# Patient Record
Sex: MALE | Race: WHITE | Employment: FULL TIME | URBAN - METROPOLITAN AREA
[De-identification: names, ages, dates, MRNs, and addresses within clinical notes are randomized per-mention and may not be internally consistent; named-entity substitution may affect disease eponyms.]

---
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
## 2023-03-31 ENCOUNTER — TELEPHONE (OUTPATIENT)
Dept: FAMILY MEDICINE CLINIC | Age: 23
End: 2023-03-31

## 2023-03-31 NOTE — TELEPHONE ENCOUNTER
FYI:  Message conveyed to Mother Angeline. She was not happy and said she is not going to take him to the ER.

## 2023-03-31 NOTE — TELEPHONE ENCOUNTER
Mom (Angeline) called stating that Louie Myles was sitting on toilet this morning at 6 am and passed out. No symptoms, just passed out. States this is not the first time this has happened. He has some sinus drainage. I told her he should go to the ER, she disagreed. Wants to know if you would order labs, Shayne Huynh thinks it may be his sugar\". Please advise.

## 2023-04-21 ENCOUNTER — HOSPITAL ENCOUNTER (EMERGENCY)
Age: 23
Discharge: HOME | End: 2023-04-21
Attending: STUDENT IN AN ORGANIZED HEALTH CARE EDUCATION/TRAINING PROGRAM
Payer: COMMERCIAL

## 2023-04-21 ENCOUNTER — APPOINTMENT (OUTPATIENT)
Dept: CT IMAGING | Age: 23
End: 2023-04-21
Payer: COMMERCIAL

## 2023-04-21 ENCOUNTER — APPOINTMENT (OUTPATIENT)
Dept: GENERAL RADIOLOGY | Age: 23
End: 2023-04-21
Payer: COMMERCIAL

## 2023-04-21 VITALS
TEMPERATURE: 97 F | RESPIRATION RATE: 20 BRPM | HEART RATE: 74 BPM | HEIGHT: 70 IN | OXYGEN SATURATION: 98 % | SYSTOLIC BLOOD PRESSURE: 103 MMHG | BODY MASS INDEX: 30.06 KG/M2 | WEIGHT: 210 LBS | DIASTOLIC BLOOD PRESSURE: 66 MMHG

## 2023-04-21 DIAGNOSIS — R55 VASOVAGAL SYNCOPE: Primary | ICD-10-CM

## 2023-04-21 LAB
ABSOLUTE EOS #: 0.2 K/UL (ref 0–0.4)
ABSOLUTE LYMPH #: 2.2 K/UL (ref 1.2–5.2)
ABSOLUTE MONO #: 0.6 K/UL (ref 0.1–1.3)
ALBUMIN SERPL-MCNC: 4.8 G/DL (ref 3.5–5.2)
ALP SERPL-CCNC: 75 U/L (ref 40–129)
ALT SERPL-CCNC: 21 U/L (ref 5–41)
ANION GAP SERPL CALCULATED.3IONS-SCNC: 10 MMOL/L (ref 9–17)
AST SERPL-CCNC: 17 U/L
BASOPHILS # BLD: 1 % (ref 0–2)
BASOPHILS ABSOLUTE: 0.1 K/UL (ref 0–0.2)
BILIRUB SERPL-MCNC: 0.6 MG/DL (ref 0.3–1.2)
BUN SERPL-MCNC: 15 MG/DL (ref 6–20)
CALCIUM SERPL-MCNC: 9.7 MG/DL (ref 8.6–10.4)
CHLORIDE SERPL-SCNC: 100 MMOL/L (ref 98–107)
CO2 SERPL-SCNC: 29 MMOL/L (ref 20–31)
CREAT SERPL-MCNC: 0.78 MG/DL (ref 0.7–1.2)
EKG ATRIAL RATE: 62 BPM
EKG P AXIS: 58 DEGREES
EKG P-R INTERVAL: 172 MS
EKG Q-T INTERVAL: 400 MS
EKG QRS DURATION: 102 MS
EKG QTC CALCULATION (BAZETT): 406 MS
EKG R AXIS: 35 DEGREES
EKG T AXIS: 6 DEGREES
EKG VENTRICULAR RATE: 62 BPM
EOSINOPHILS RELATIVE PERCENT: 2 % (ref 0–4)
GFR SERPL CREATININE-BSD FRML MDRD: >60 ML/MIN/1.73M2
GLUCOSE SERPL-MCNC: 99 MG/DL (ref 70–99)
HCT VFR BLD AUTO: 46.7 % (ref 41–53)
HGB BLD-MCNC: 16.2 G/DL (ref 13.5–17.5)
INR PPP: 1
LYMPHOCYTES # BLD: 30 % (ref 25–45)
MAGNESIUM SERPL-MCNC: 2.2 MG/DL (ref 1.6–2.6)
MCH RBC QN AUTO: 29.7 PG (ref 26–34)
MCHC RBC AUTO-ENTMCNC: 34.8 G/DL (ref 31–37)
MCV RBC AUTO: 85.4 FL (ref 80–100)
MONOCYTES # BLD: 8 % (ref 2–8)
PDW BLD-RTO: 13.2 % (ref 11.5–14.9)
PLATELET # BLD AUTO: 196 K/UL (ref 150–450)
PMV BLD AUTO: 10.1 FL (ref 6–12)
POTASSIUM SERPL-SCNC: 3.7 MMOL/L (ref 3.7–5.3)
PROT SERPL-MCNC: 7.4 G/DL (ref 6.4–8.3)
PROTHROMBIN TIME: 13.5 SEC (ref 11.8–14.6)
RBC # BLD: 5.46 M/UL (ref 4.5–5.9)
SEG NEUTROPHILS: 59 % (ref 34–64)
SEGMENTED NEUTROPHILS ABSOLUTE COUNT: 4.2 K/UL (ref 1.3–9.1)
SODIUM SERPL-SCNC: 139 MMOL/L (ref 135–144)
TROPONIN I SERPL DL<=0.01 NG/ML-MCNC: <6 NG/L (ref 0–22)
WBC # BLD AUTO: 7.1 K/UL (ref 4.5–13.5)

## 2023-04-21 PROCEDURE — 85610 PROTHROMBIN TIME: CPT

## 2023-04-21 PROCEDURE — 84484 ASSAY OF TROPONIN QUANT: CPT

## 2023-04-21 PROCEDURE — 93005 ELECTROCARDIOGRAM TRACING: CPT

## 2023-04-21 PROCEDURE — 36415 COLL VENOUS BLD VENIPUNCTURE: CPT

## 2023-04-21 PROCEDURE — 80053 COMPREHEN METABOLIC PANEL: CPT

## 2023-04-21 PROCEDURE — 71045 X-RAY EXAM CHEST 1 VIEW: CPT

## 2023-04-21 PROCEDURE — 99285 EMERGENCY DEPT VISIT HI MDM: CPT

## 2023-04-21 PROCEDURE — 83735 ASSAY OF MAGNESIUM: CPT

## 2023-04-21 PROCEDURE — 70450 CT HEAD/BRAIN W/O DYE: CPT

## 2023-04-21 PROCEDURE — 2580000003 HC RX 258: Performed by: STUDENT IN AN ORGANIZED HEALTH CARE EDUCATION/TRAINING PROGRAM

## 2023-04-21 PROCEDURE — 85025 COMPLETE CBC W/AUTO DIFF WBC: CPT

## 2023-04-21 RX ORDER — 0.9 % SODIUM CHLORIDE 0.9 %
1000 INTRAVENOUS SOLUTION INTRAVENOUS ONCE
Status: COMPLETED | OUTPATIENT
Start: 2023-04-21 | End: 2023-04-21

## 2023-04-21 RX ADMIN — SODIUM CHLORIDE 1000 ML: 9 INJECTION, SOLUTION INTRAVENOUS at 04:19

## 2023-04-21 ASSESSMENT — ENCOUNTER SYMPTOMS
EYE REDNESS: 0
NAUSEA: 0
SORE THROAT: 0
DIARRHEA: 0
CHEST TIGHTNESS: 0
SHORTNESS OF BREATH: 0
ABDOMINAL PAIN: 0
RHINORRHEA: 0
VOMITING: 0
EYE DISCHARGE: 0

## 2023-04-21 ASSESSMENT — PAIN DESCRIPTION - PAIN TYPE: TYPE: ACUTE PAIN

## 2023-04-21 ASSESSMENT — LIFESTYLE VARIABLES
HOW MANY STANDARD DRINKS CONTAINING ALCOHOL DO YOU HAVE ON A TYPICAL DAY: PATIENT DOES NOT DRINK
HOW OFTEN DO YOU HAVE A DRINK CONTAINING ALCOHOL: NEVER

## 2023-04-21 ASSESSMENT — PAIN - FUNCTIONAL ASSESSMENT
PAIN_FUNCTIONAL_ASSESSMENT: 0-10
PAIN_FUNCTIONAL_ASSESSMENT: NONE - DENIES PAIN

## 2023-04-21 ASSESSMENT — PAIN SCALES - GENERAL: PAINLEVEL_OUTOF10: 2

## 2023-04-21 NOTE — ED PROVIDER NOTES
derangement, arrhythmia    Diagnoses Considered but Do Not Suspect: Cardiogenic syncope, neurogenic syncope    Pertinent Comorbid Conditions: None    2)  Data Reviewed and Analyzed  (Lab and radiology tests/orders below in next section)    External Documents Reviewed: Previous call into his primary physician for another single episode in the same settting    My EKG interpretation: See procedure      Imaging that is independently reviewed and interpreted by me as:  CT head and cxr unremarkable    3)  Treatment and Disposition    ED Course as of 04/21/23 0618   Fri Apr 21, 2023   0431 CBC with Auto Differential:    WBC 7.1   RBC 5.46   Hemoglobin Quant 16.2   Hematocrit 46.7   MCV 85.4   MCH 29.7   MCHC 34.8   RDW 13.2   Platelet Count 003   MPV 10.1   Seg Neutrophils 59   Lymphocytes 30   Monocytes 8   Eosinophils % 2   Basophils 1   Segs Absolute 4.20   Absolute Lymph # 2.20   Absolute Mono # 0.60   Absolute Eos # 0.20   Basophils Absolute 0.10  Unremarkable [ELISA]   0446 CMP:    Glucose, Random 99   BUN,BUNPL 15   Creatinine 0.78   Est, Glom Filt Rate >60   CALCIUM, SERUM, 314196 9.7   Sodium 139   Potassium 3.7   Chloride 100   CO2 29   Anion Gap 10   Alk Phos 75   ALT 21   AST 17   BILIRUBIN TOTAL 0.6   Total Protein 7.4   Albumin 4.8  Unremarkable [ELISA]   0446 Magnesium:    Magnesium 2.2  normal [ELISA]   0446 Protime-INR:    Prothrombin Time 13.5   INR 1.0  normal [ELISA]   0446 Troponin:    Troponin, High Sensitivity <6  negative [ELISA]   0550 XR CHEST PORTABLE  No acute process [ELISA]   0618 CT Head W/O Contrast  Negative [ELISA]      ED Course User Index  [ELISA] Oliver Rawls MD       Patient repeat assessment:  0515 resting comfortably asymptomatic    Disposition discussion with patient/family, Shared Decision Making:  Discussed with patient likely vasovagal syncope    Discussed discharge with pcp follow up within two days    Return precautions given for chest pain, shortness of breath, severe headache, numbness,

## 2023-04-21 NOTE — ED NOTES
Pt  father was reluctant for Ct Head . Explained  risks and benefits   Agreed for  CT .  Shifted to radiology for  CT Head      Huy Maria RN  04/21/23 2995

## 2023-04-21 NOTE — ED TRIAGE NOTES
Mode of arrival (squad #, walk in, police, etc) : Walk in        Chief complaint(s): Syncope       Arrival Note (brief scenario, treatment PTA, etc). : Loss of consciousness at home Washroom  today at 0315 , hit his head and cheek , Abrasion on  left forehead and left upper cheek         C= \"Have you ever felt that you should Cut down on your drinking? \"  No  A= \"Have people Annoyed you by criticizing your drinking? \"  No  G= \"Have you ever felt bad or Guilty about your drinking? \"  No  E= \"Have you ever had a drink as an Eye-opener first thing in the morning to steady your nerves or to help a hangover? \"  No      Deferred []      Reason for deferring: N/A    *If yes to two or more: probable alcohol abuse. *

## 2023-04-25 LAB
EKG ATRIAL RATE: 62 BPM
EKG P AXIS: 58 DEGREES
EKG P-R INTERVAL: 172 MS
EKG Q-T INTERVAL: 400 MS
EKG QRS DURATION: 102 MS
EKG QTC CALCULATION (BAZETT): 406 MS
EKG R AXIS: 35 DEGREES
EKG T AXIS: 6 DEGREES
EKG VENTRICULAR RATE: 62 BPM

## 2023-11-21 ENCOUNTER — TELEPHONE (OUTPATIENT)
Dept: FAMILY MEDICINE CLINIC | Age: 23
End: 2023-11-21

## 2023-11-21 RX ORDER — BROMPHENIRAMINE MALEATE, PSEUDOEPHEDRINE HYDROCHLORIDE, AND DEXTROMETHORPHAN HYDROBROMIDE 2; 30; 10 MG/5ML; MG/5ML; MG/5ML
5 SYRUP ORAL 4 TIMES DAILY PRN
COMMUNITY
Start: 2023-11-21

## 2023-11-21 NOTE — TELEPHONE ENCOUNTER
Message from PCP conveyed to the patient's mother. Patient's mother asked why the patient is \"always sent\" to someone else. She was offered an appointment with Ashley Zhao CNP for 11/22/23 and she stated that she will get back to us.

## 2023-11-21 NOTE — TELEPHONE ENCOUNTER
Patient's mother called  to say that the patient had just come home from [address] [facility] and the patient was prescribed   brompheniramine-pseudoeph-DM 2-30-10 mg/5 mL syrup    Indications: Viral URI Take 5 mL by mouth 4 (four) times a day as needed for congestion, cough or allergies. 100 mL    The patient was not tested for Covid because Sx started over 5 days ago. The patient is complaining of SOB. Mom is asking if an antibiotic needs to be prescribed.   [address]

## 2023-11-21 NOTE — TELEPHONE ENCOUNTER
Antibiotics will not help a viral infection. Patient is to take the medication as prescribed by the urgent care center provider. If his condition worsens he is to go to the ER.

## 2024-08-23 ENCOUNTER — TELEPHONE (OUTPATIENT)
Dept: FAMILY MEDICINE CLINIC | Age: 24
End: 2024-08-23

## 2024-08-23 NOTE — TELEPHONE ENCOUNTER
ROSIE: Patient's Mother (Angeline) called stating Gregorio is having sinus issues, having ear pain, eyes watering.  I noticed he hadn't been seen here since 2-4-21, considered a new patient now.  He needs an appointment to be seen.  Offered appointment today with Jonelle, Mother refused and stated that he can't miss work and she will go to urgent care.  Hung up.

## 2024-08-27 ENCOUNTER — OFFICE VISIT (OUTPATIENT)
Dept: FAMILY MEDICINE CLINIC | Age: 24
End: 2024-08-27
Payer: COMMERCIAL

## 2024-08-27 VITALS
OXYGEN SATURATION: 98 % | RESPIRATION RATE: 16 BRPM | BODY MASS INDEX: 32.75 KG/M2 | HEART RATE: 97 BPM | WEIGHT: 228.8 LBS | SYSTOLIC BLOOD PRESSURE: 110 MMHG | HEIGHT: 70 IN | DIASTOLIC BLOOD PRESSURE: 82 MMHG

## 2024-08-27 DIAGNOSIS — J01.10 ACUTE NON-RECURRENT FRONTAL SINUSITIS: ICD-10-CM

## 2024-08-27 DIAGNOSIS — H66.93 BILATERAL OTITIS MEDIA, UNSPECIFIED OTITIS MEDIA TYPE: Primary | ICD-10-CM

## 2024-08-27 PROCEDURE — 99203 OFFICE O/P NEW LOW 30 MIN: CPT | Performed by: FAMILY MEDICINE

## 2024-08-27 RX ORDER — FLUTICASONE PROPIONATE 50 MCG
1 SPRAY, SUSPENSION (ML) NASAL DAILY
COMMUNITY
Start: 2024-08-24

## 2024-08-27 SDOH — ECONOMIC STABILITY: INCOME INSECURITY: HOW HARD IS IT FOR YOU TO PAY FOR THE VERY BASICS LIKE FOOD, HOUSING, MEDICAL CARE, AND HEATING?: NOT HARD AT ALL

## 2024-08-27 SDOH — ECONOMIC STABILITY: FOOD INSECURITY: WITHIN THE PAST 12 MONTHS, THE FOOD YOU BOUGHT JUST DIDN'T LAST AND YOU DIDN'T HAVE MONEY TO GET MORE.: NEVER TRUE

## 2024-08-27 SDOH — ECONOMIC STABILITY: FOOD INSECURITY: WITHIN THE PAST 12 MONTHS, YOU WORRIED THAT YOUR FOOD WOULD RUN OUT BEFORE YOU GOT MONEY TO BUY MORE.: NEVER TRUE

## 2024-08-27 ASSESSMENT — PATIENT HEALTH QUESTIONNAIRE - PHQ9
SUM OF ALL RESPONSES TO PHQ QUESTIONS 1-9: 0
1. LITTLE INTEREST OR PLEASURE IN DOING THINGS: NOT AT ALL
2. FEELING DOWN, DEPRESSED OR HOPELESS: NOT AT ALL
SUM OF ALL RESPONSES TO PHQ9 QUESTIONS 1 & 2: 0
SUM OF ALL RESPONSES TO PHQ QUESTIONS 1-9: 0

## 2024-08-27 ASSESSMENT — ENCOUNTER SYMPTOMS
SHORTNESS OF BREATH: 0
ABDOMINAL PAIN: 0
RHINORRHEA: 1
CHEST TIGHTNESS: 0
BLOOD IN STOOL: 0
SINUS PAIN: 1
SINUS PRESSURE: 1

## 2024-08-27 NOTE — PROGRESS NOTES
[facility]      Date of Visit:  2024  Patient Name: Gregorio Art   Patient :  2000     CHIEF COMPLAINT:     Gregorio Art is a 23 y.o. male who presents today for an general visit to be evaluated for the following condition(s):  Chief Complaint   Patient presents with    Otitis Media     Double ear infection   Went to urgent care on Saturday has not gotten better since        HISTORY OF PRESENT ILLNESS:     Visit Information    Have you changed or started any medications since your last visit including any over-the-counter medicines, vitamins, or herbal medicines? no   Have you stopped taking any of your medications? Is so, why? -  no  Are you having any side effects from any of your medications? - no    Have you seen any other physician or provider since your last visit?  yes - urgent care    Have you had any other diagnostic tests since your last visit?  no   Have you been seen in the emergency room and/or had an admission in a hospital since we last saw you?  no   Have you had your routine dental cleaning in the past 6 months?  yes - 2024     Do you have an active [employer] account? If no, what is the barrier?  Yes    Patient Care Team:  Shakir Horowitz MD as PCP - General (Family Medicine)    Medical History Review  Past Medical, Family, and Social History reviewed and does contribute to the patient presenting condition    Health Maintenance   Topic Date Due    Depression Screen  Never done    HPV vaccine (1 - Male 3-dose series) Never done    HIV screen  Never done    Hepatitis C screen  Never done    COVID-19 Vaccine (1 - - season) Never done    Flu vaccine (1) 2024    DTaP/Tdap/Td vaccine (7 - Td or Tdap) 2025    Hepatitis A vaccine  Completed    Hepatitis B vaccine  Completed    Polio vaccine  Completed    Varicella vaccine  Completed    Meningococcal (ACWY) vaccine  Completed    Hib vaccine  Aged Out    Pneumococcal 0-64 years Vaccine  Aged Out     General: No scleral icterus.        Right eye: No discharge.         Left eye: No discharge.      Conjunctiva/sclera: Conjunctivae normal.   Cardiovascular:      Rate and Rhythm: Normal rate and regular rhythm.      Heart sounds: Normal heart sounds.   Pulmonary:      Effort: Pulmonary effort is normal. No respiratory distress.      Breath sounds: Normal breath sounds. No wheezing.   Abdominal:      General: There is no distension.      Palpations: Abdomen is soft.      Tenderness: There is no abdominal tenderness.   Musculoskeletal:      Cervical back: Neck supple.   Skin:     General: Skin is warm and dry.      Findings: No rash.   Neurological:      Mental Status: He is alert and oriented to person, place, and time.   Psychiatric:         Mood and Affect: Mood normal.         Behavior: Behavior normal.         ASSESSMENT/PLAN:     1. Bilateral otitis media, unspecified otitis media type    - loratadine-pseudoephedrine (CLARITIN-D 12HR) 5-120 MG per extended release tablet; Take 1 tablet by mouth 2 times daily  Dispense: 30 tablet; Refill: 1  - amoxicillin-clavulanate (AUGMENTIN) 875-125 MG per tablet; Take 1 tablet by mouth 2 times daily for 10 days Take with food  Dispense: 20 tablet; Refill: 0  Continue Flonase nasal spray.  Switch from amoxicillin to Augmentin.  2. Acute non-recurrent frontal sinusitis   - loratadine-pseudoephedrine (CLARITIN-D 12HR) 5-120 MG per extended release tablet; Take 1 tablet by mouth 2 times daily  Dispense: 30 tablet; Refill: 1  - amoxicillin-clavulanate (AUGMENTIN) 875-125 MG per tablet; Take 1 tablet by mouth 2 times daily for 10 days Take with food  Dispense: 20 tablet; Refill: 0  Continue Flonase nasal spray.  Switch from amoxicillin to Augmentin.    No orders of the defined types were placed in this encounter.       Orders Placed This Encounter   Medications    loratadine-pseudoephedrine (CLARITIN-D 12HR) 5-120 MG per extended release tablet     Sig: Take 1 tablet by mouth 2

## 2024-12-05 ENCOUNTER — OFFICE VISIT (OUTPATIENT)
Dept: FAMILY MEDICINE CLINIC | Age: 24
End: 2024-12-05
Payer: COMMERCIAL

## 2024-12-05 VITALS
SYSTOLIC BLOOD PRESSURE: 104 MMHG | BODY MASS INDEX: 32.14 KG/M2 | RESPIRATION RATE: 16 BRPM | WEIGHT: 224 LBS | OXYGEN SATURATION: 95 % | HEART RATE: 92 BPM | TEMPERATURE: 97 F | DIASTOLIC BLOOD PRESSURE: 80 MMHG

## 2024-12-05 DIAGNOSIS — J01.00 ACUTE NON-RECURRENT MAXILLARY SINUSITIS: Primary | ICD-10-CM

## 2024-12-05 DIAGNOSIS — H65.92 LEFT OTITIS MEDIA WITH EFFUSION: ICD-10-CM

## 2024-12-05 PROCEDURE — 99213 OFFICE O/P EST LOW 20 MIN: CPT | Performed by: NURSE PRACTITIONER

## 2024-12-05 RX ORDER — BENZONATATE 100 MG/1
100 CAPSULE ORAL 3 TIMES DAILY PRN
Qty: 30 CAPSULE | Refills: 0 | Status: SHIPPED | OUTPATIENT
Start: 2024-12-05 | End: 2024-12-15

## 2024-12-05 RX ORDER — AMOXICILLIN 875 MG/1
875 TABLET, COATED ORAL 2 TIMES DAILY
Qty: 14 TABLET | Refills: 0 | Status: SHIPPED | OUTPATIENT
Start: 2024-12-05 | End: 2024-12-12

## 2024-12-05 ASSESSMENT — PATIENT HEALTH QUESTIONNAIRE - PHQ9
1. LITTLE INTEREST OR PLEASURE IN DOING THINGS: NOT AT ALL
SUM OF ALL RESPONSES TO PHQ QUESTIONS 1-9: 0
SUM OF ALL RESPONSES TO PHQ9 QUESTIONS 1 & 2: 0
SUM OF ALL RESPONSES TO PHQ QUESTIONS 1-9: 0
2. FEELING DOWN, DEPRESSED OR HOPELESS: NOT AT ALL

## 2024-12-05 ASSESSMENT — ENCOUNTER SYMPTOMS
SINUS PRESSURE: 1
NAUSEA: 0
COUGH: 1
ABDOMINAL PAIN: 0
RHINORRHEA: 1
VOMITING: 0
SHORTNESS OF BREATH: 0

## 2024-12-05 NOTE — PROGRESS NOTES
Gregorio Art (:  2000) is a 24 y.o. male,Established patient, here for evaluation of the following chief complaint(s):  Cough (X1 WEEK dry cough. Did not test for Covid. The patient is taking OTC meds for Sx - Nyquil, Dayquil, Tylenol, ibuprofen.) and Sinusitis (Runny, yellow green nasal discharge. Left ear pain, complaining of body aches, )         Assessment & Plan  Acute non-recurrent maxillary sinusitis   Acute condition, new, Supportive care with appropriate antipyretics and fluids.  Start amoxicillin and continue DayQuil /Niquil and encouraged to call if symptoms are not improving    Orders:    amoxicillin (AMOXIL) 875 MG tablet; Take 1 tablet by mouth 2 times daily for 7 days    benzonatate (TESSALON) 100 MG capsule; Take 1 capsule by mouth 3 times daily as needed for Cough    Left otitis media with effusion   Acute condition, new, Supportive care with appropriate antipyretics and fluids.  Continue claritin D and flonase            Return for as scheduled .       Subjective   Visit Information    Have you changed or started any medications since your last visit including any over-the-counter medicines, vitamins, or herbal medicines? no   Are you having any side effects from any of your medications? -  no  Have you stopped taking any of your medications? Is so, why? -  no    Have you seen any other physician or provider since your last visit? No  Have you had any other diagnostic tests since your last visit? No  Have you been seen in the emergency room and/or had an admission to a hospital since we last saw you? No  Have you had your routine dental cleaning in the past 6 months? no    Have you activated your [employer] account? If not, what are your barriers? Yes     Patient Care Team:  Shakir Horowitz MD as PCP - General (Family Medicine)  Shakir Horowitz MD as PCP - Empaneled Provider    Medical History Review  Past Medical, Family, and Social History reviewed and does contribute to the

## 2024-12-31 ENCOUNTER — APPOINTMENT (OUTPATIENT)
Dept: ULTRASOUND IMAGING | Age: 24
End: 2024-12-31
Payer: COMMERCIAL

## 2024-12-31 ENCOUNTER — HOSPITAL ENCOUNTER (EMERGENCY)
Age: 24
Discharge: HOME | End: 2024-12-31
Attending: EMERGENCY MEDICINE
Payer: COMMERCIAL

## 2024-12-31 ENCOUNTER — TELEPHONE (OUTPATIENT)
Dept: FAMILY MEDICINE CLINIC | Age: 24
End: 2024-12-31

## 2024-12-31 VITALS
TEMPERATURE: 98.4 F | WEIGHT: 225 LBS | RESPIRATION RATE: 18 BRPM | OXYGEN SATURATION: 97 % | SYSTOLIC BLOOD PRESSURE: 140 MMHG | HEART RATE: 63 BPM | DIASTOLIC BLOOD PRESSURE: 85 MMHG | BODY MASS INDEX: 32.21 KG/M2 | HEIGHT: 70 IN

## 2024-12-31 DIAGNOSIS — N50.811 PAIN IN RIGHT TESTICLE: Primary | ICD-10-CM

## 2024-12-31 LAB
BILIRUB UR QL STRIP: NEGATIVE
CLARITY UR: CLEAR
COLOR UR: YELLOW
COMMENT: NORMAL
GLUCOSE UR STRIP-MCNC: NEGATIVE MG/DL
HGB UR QL STRIP.AUTO: NEGATIVE
KETONES UR STRIP-MCNC: NEGATIVE MG/DL
LEUKOCYTE ESTERASE UR QL STRIP: NEGATIVE
NITRITE UR QL STRIP: NEGATIVE
PH UR STRIP: 5.5 [PH] (ref 5–8)
PROT UR STRIP-MCNC: NEGATIVE MG/DL
SP GR UR STRIP: 1.02 (ref 1–1.03)
UROBILINOGEN UR STRIP-ACNC: NORMAL EU/DL (ref 0–1)

## 2024-12-31 PROCEDURE — 2500000003 HC RX 250 WO HCPCS

## 2024-12-31 PROCEDURE — 6360000002 HC RX W HCPCS: Performed by: EMERGENCY MEDICINE

## 2024-12-31 PROCEDURE — 87491 CHLMYD TRACH DNA AMP PROBE: CPT

## 2024-12-31 PROCEDURE — 99284 EMERGENCY DEPT VISIT MOD MDM: CPT

## 2024-12-31 PROCEDURE — 81003 URINALYSIS AUTO W/O SCOPE: CPT

## 2024-12-31 PROCEDURE — 96372 THER/PROPH/DIAG INJ SC/IM: CPT

## 2024-12-31 PROCEDURE — 76870 US EXAM SCROTUM: CPT

## 2024-12-31 PROCEDURE — 87591 N.GONORRHOEAE DNA AMP PROB: CPT

## 2024-12-31 PROCEDURE — 6370000000 HC RX 637 (ALT 250 FOR IP): Performed by: EMERGENCY MEDICINE

## 2024-12-31 PROCEDURE — 87086 URINE CULTURE/COLONY COUNT: CPT

## 2024-12-31 RX ORDER — DOXYCYCLINE HYCLATE 100 MG
100 TABLET ORAL 2 TIMES DAILY
Qty: 14 TABLET | Refills: 0 | Status: SHIPPED | OUTPATIENT
Start: 2024-12-31 | End: 2025-01-07

## 2024-12-31 RX ORDER — IBUPROFEN 600 MG/1
600 TABLET, FILM COATED ORAL EVERY 6 HOURS PRN
Qty: 60 TABLET | Refills: 0 | Status: SHIPPED | OUTPATIENT
Start: 2024-12-31

## 2024-12-31 RX ORDER — ACETAMINOPHEN 500 MG
1000 TABLET ORAL EVERY 8 HOURS PRN
Qty: 60 TABLET | Refills: 0 | Status: SHIPPED | OUTPATIENT
Start: 2024-12-31

## 2024-12-31 RX ORDER — DOXYCYCLINE 100 MG/1
100 CAPSULE ORAL ONCE
Status: COMPLETED | OUTPATIENT
Start: 2024-12-31 | End: 2024-12-31

## 2024-12-31 RX ORDER — WATER 10 ML/10ML
INJECTION INTRAMUSCULAR; INTRAVENOUS; SUBCUTANEOUS
Status: COMPLETED
Start: 2024-12-31 | End: 2024-12-31

## 2024-12-31 RX ORDER — CEFTRIAXONE 500 MG/1
500 INJECTION, POWDER, FOR SOLUTION INTRAMUSCULAR; INTRAVENOUS ONCE
Status: COMPLETED | OUTPATIENT
Start: 2024-12-31 | End: 2024-12-31

## 2024-12-31 RX ADMIN — CEFTRIAXONE SODIUM 500 MG: 500 INJECTION, POWDER, FOR SOLUTION INTRAMUSCULAR; INTRAVENOUS at 16:23

## 2024-12-31 RX ADMIN — WATER 1.8 ML: 1 INJECTION INTRAMUSCULAR; INTRAVENOUS; SUBCUTANEOUS at 16:27

## 2024-12-31 RX ADMIN — DOXYCYCLINE 100 MG: 100 CAPSULE ORAL at 16:23

## 2024-12-31 ASSESSMENT — PAIN - FUNCTIONAL ASSESSMENT: PAIN_FUNCTIONAL_ASSESSMENT: 0-10

## 2024-12-31 ASSESSMENT — LIFESTYLE VARIABLES
HOW MANY STANDARD DRINKS CONTAINING ALCOHOL DO YOU HAVE ON A TYPICAL DAY: 1 OR 2
HOW OFTEN DO YOU HAVE A DRINK CONTAINING ALCOHOL: 2-4 TIMES A MONTH

## 2024-12-31 ASSESSMENT — PAIN SCALES - GENERAL: PAINLEVEL_OUTOF10: 3

## 2024-12-31 NOTE — TELEPHONE ENCOUNTER
PTS MOM CALLED STATING SON WAS HAVING SUDDEN ONSET TESTICLE PAIN. HURTS WITH MOVEMENT (WALKING, SITTING, STANDING).    HE TRIED TO GO TO [facility] AND [facility] ER YESTERDAY, BUT THE WAIT WAS OVER 4 HOURS SO HE LEFT.    MOM CALLED TO GET PATIENT IN TODAY, BUT WE HAVE NO PROVIDER IN THE OFFICE. MOM WAS ADVISED TO HAVE SON GO BACK TO THE ER TO BE EVALUATED IMMEDIATELY, ESPECIALLY BEING SUDDEN ONSET.     MOM VERBALIZED UNDERSTANDING AND WILL ADVISE HIM TO GO.

## 2024-12-31 NOTE — ED PROVIDER NOTES
Doppler flow within  the testicle.     Scrotal Sac: No evidence of hydrocele.     Epididymis: No acute abnormality.        Left:     Grey Scale: The left testicle demonstrates normal homogeneous echotexture  without focal lesion.  No evidence of testicular microlithiasis.     Doppler Evaluation: There is normal arterial and venous Doppler flow within  the testicle.     Scrotal Sac: No evidence of hydrocele.     Epididymis: No acute abnormality.    LABS: Lab orders shown below, the results are reviewed by myself, and all abnormals are listed below.  Labs Reviewed   C.TRACHOMATIS N.GONORRHOEAE DNA, URINE   CULTURE, URINE   URINALYSIS WITH REFLEX TO CULTURE       Vitals Reviewed:    Vitals:    12/31/24 1341   BP: (!) 140/85   Pulse: 63   Resp: 18   Temp: 98.4 °F (36.9 °C)   TempSrc: Oral   SpO2: 97%   Weight: 102.1 kg (225 lb)   Height: 1.778 m (5' 10\")     MEDICATIONS GIVEN TO PATIENT THIS ENCOUNTER:  Orders Placed This Encounter   Medications    cefTRIAXone (ROCEPHIN) injection 500 mg     Order Specific Question:   Antimicrobial Indications     Answer:   STD infection    doxycycline monohydrate (MONODOX) capsule 100 mg     Order Specific Question:   Antimicrobial Indications     Answer:   STD infection    acetaminophen (TYLENOL) 500 MG tablet     Sig: Take 2 tablets by mouth every 8 hours as needed for Pain     Dispense:  60 tablet     Refill:  0    ibuprofen (IBU) 600 MG tablet     Sig: Take 1 tablet by mouth every 6 hours as needed for Pain     Dispense:  60 tablet     Refill:  0    doxycycline hyclate (VIBRA-TABS) 100 MG tablet     Sig: Take 1 tablet by mouth 2 times daily for 7 days     Dispense:  14 tablet     Refill:  0     DISCHARGE PRESCRIPTIONS:  New Prescriptions    ACETAMINOPHEN (TYLENOL) 500 MG TABLET    Take 2 tablets by mouth every 8 hours as needed for Pain    DOXYCYCLINE HYCLATE (VIBRA-TABS) 100 MG TABLET    Take 1 tablet by mouth 2 times daily for 7 days    IBUPROFEN (IBU) 600 MG TABLET    Take 1

## 2025-01-01 LAB
MICROORGANISM SPEC CULT: NO GROWTH
SPECIMEN DESCRIPTION: NORMAL

## 2025-01-02 LAB
CHLAMYDIA DNA UR QL NAA+PROBE: NEGATIVE
N GONORRHOEA DNA UR QL NAA+PROBE: NEGATIVE
SPECIMEN DESCRIPTION: NORMAL

## 2025-08-04 ENCOUNTER — OFFICE VISIT (OUTPATIENT)
Dept: UROLOGY | Age: 25
End: 2025-08-04
Payer: COMMERCIAL

## 2025-08-04 VITALS — SYSTOLIC BLOOD PRESSURE: 120 MMHG | DIASTOLIC BLOOD PRESSURE: 78 MMHG | HEART RATE: 94 BPM | TEMPERATURE: 97.6 F

## 2025-08-04 DIAGNOSIS — N50.811 PAIN IN RIGHT TESTICLE: Primary | ICD-10-CM

## 2025-08-04 DIAGNOSIS — R10.2 PELVIC PAIN: Primary | ICD-10-CM

## 2025-08-04 PROCEDURE — 99204 OFFICE O/P NEW MOD 45 MIN: CPT | Performed by: UROLOGY

## 2025-08-04 ASSESSMENT — ENCOUNTER SYMPTOMS
BACK PAIN: 0
ABDOMINAL PAIN: 0
SHORTNESS OF BREATH: 0